# Patient Record
Sex: FEMALE | Race: BLACK OR AFRICAN AMERICAN | Employment: UNEMPLOYED | URBAN - METROPOLITAN AREA
[De-identification: names, ages, dates, MRNs, and addresses within clinical notes are randomized per-mention and may not be internally consistent; named-entity substitution may affect disease eponyms.]

---

## 2021-12-03 ENCOUNTER — APPOINTMENT (OUTPATIENT)
Dept: CT IMAGING | Age: 36
End: 2021-12-03
Payer: MEDICAID

## 2021-12-03 ENCOUNTER — HOSPITAL ENCOUNTER (EMERGENCY)
Age: 36
Discharge: HOME | End: 2021-12-03
Attending: EMERGENCY MEDICINE
Payer: MEDICAID

## 2021-12-03 VITALS
RESPIRATION RATE: 16 BRPM | OXYGEN SATURATION: 100 % | SYSTOLIC BLOOD PRESSURE: 166 MMHG | TEMPERATURE: 98.1 F | WEIGHT: 208.25 LBS | HEART RATE: 79 BPM | BODY MASS INDEX: 40.88 KG/M2 | DIASTOLIC BLOOD PRESSURE: 100 MMHG | HEIGHT: 60 IN

## 2021-12-03 DIAGNOSIS — M79.602 LEFT ARM PAIN: ICD-10-CM

## 2021-12-03 DIAGNOSIS — I10 ESSENTIAL HYPERTENSION: Primary | ICD-10-CM

## 2021-12-03 LAB
ANION GAP SERPL CALCULATED.3IONS-SCNC: 11 MMOL/L (ref 3–16)
BASOPHILS ABSOLUTE: 0.1 K/UL (ref 0–0.2)
BASOPHILS RELATIVE PERCENT: 1.1 %
BUN BLDV-MCNC: 9 MG/DL (ref 7–20)
CALCIUM SERPL-MCNC: 9.2 MG/DL (ref 8.3–10.6)
CHLORIDE BLD-SCNC: 106 MMOL/L (ref 99–110)
CO2: 24 MMOL/L (ref 21–32)
CREAT SERPL-MCNC: 0.7 MG/DL (ref 0.6–1.1)
EOSINOPHILS ABSOLUTE: 0.4 K/UL (ref 0–0.6)
EOSINOPHILS RELATIVE PERCENT: 5.4 %
GFR AFRICAN AMERICAN: >60
GFR NON-AFRICAN AMERICAN: >60
GLUCOSE BLD-MCNC: 104 MG/DL (ref 70–99)
HCT VFR BLD CALC: 35.2 % (ref 36–48)
HEMOGLOBIN: 11.4 G/DL (ref 12–16)
LYMPHOCYTES ABSOLUTE: 1.4 K/UL (ref 1–5.1)
LYMPHOCYTES RELATIVE PERCENT: 20.7 %
MCH RBC QN AUTO: 22.7 PG (ref 26–34)
MCHC RBC AUTO-ENTMCNC: 32.4 G/DL (ref 31–36)
MCV RBC AUTO: 70.2 FL (ref 80–100)
MONOCYTES ABSOLUTE: 0.5 K/UL (ref 0–1.3)
MONOCYTES RELATIVE PERCENT: 6.5 %
NEUTROPHILS ABSOLUTE: 4.6 K/UL (ref 1.7–7.7)
NEUTROPHILS RELATIVE PERCENT: 66.3 %
PDW BLD-RTO: 16.1 % (ref 12.4–15.4)
PLATELET # BLD: 356 K/UL (ref 135–450)
PMV BLD AUTO: 7.1 FL (ref 5–10.5)
POTASSIUM REFLEX MAGNESIUM: 4.3 MMOL/L (ref 3.5–5.1)
RBC # BLD: 5.01 M/UL (ref 4–5.2)
SODIUM BLD-SCNC: 141 MMOL/L (ref 136–145)
WBC # BLD: 7 K/UL (ref 4–11)

## 2021-12-03 PROCEDURE — 70450 CT HEAD/BRAIN W/O DYE: CPT

## 2021-12-03 PROCEDURE — 99283 EMERGENCY DEPT VISIT LOW MDM: CPT

## 2021-12-03 PROCEDURE — 85025 COMPLETE CBC W/AUTO DIFF WBC: CPT

## 2021-12-03 PROCEDURE — 80048 BASIC METABOLIC PNL TOTAL CA: CPT

## 2021-12-03 PROCEDURE — 93005 ELECTROCARDIOGRAM TRACING: CPT | Performed by: EMERGENCY MEDICINE

## 2021-12-03 RX ORDER — ESCITALOPRAM OXALATE 10 MG/1
10 TABLET ORAL DAILY
COMMUNITY

## 2021-12-03 RX ORDER — AMLODIPINE BESYLATE 5 MG/1
5 TABLET ORAL DAILY
Qty: 30 TABLET | Refills: 2 | Status: SHIPPED | OUTPATIENT
Start: 2021-12-03

## 2021-12-03 NOTE — ED PROVIDER NOTES
TRIAGE CHIEF COMPLAINT:   Chief Complaint   Patient presents with    Hypertension     pt states that she started yesterday with left arm pain going down into her hand and woke up this morning with a pulsation feeling in left hand and pt went to urgent care and was referred to ER due to blood pressure being high       HPI: Cande Cochran is a 39 y.o. female who presents to the emergency department with complaint of left arm pain and elevated blood pressure. Patient states that yesterday she noted some pain in her left upper arm that occasionally would radiate down to her forearm and left hand. It would come and go. No leg symptoms. No known injury. Denies neck pain. Denies numbness or weakness. Today she felt like her left hand was pulsating. She went to urgent care and was told her blood pressure was elevated at 180/100 and that she should immediately come to an ER. She denies headache or neck pain. No chest pain or shortness of breath. Denies any back pain. No dizziness or vertigo. Denies nausea or vomiting. No abdominal pain. For approximately 1 year she has had some intermittent elevated systolic blood pressures up to 160 but has never been treated for hypertension. REVIEW OF SYSTEMS:   10 systems reviewed. Pertinent positives per HPI. Otherwise noted to be negative. I have reviewed the triage/nursing documentation and agree unless otherwise noted below.     PAST MEDICAL HISTORY:   Past Medical History:   Diagnosis Date    PCOS (polycystic ovarian syndrome)         CURRENT MEDICATIONS:   Patient's Medications   New Prescriptions    No medications on file   Previous Medications    ESCITALOPRAM (LEXAPRO) 10 MG TABLET    Take 10 mg by mouth daily   Modified Medications    No medications on file   Discontinued Medications    ONDANSETRON (ZOFRAN) 4 MG TABLET    Take 1 tablet by mouth every 8 hours as needed for Nausea        SURGICAL HISTORY:       Procedure Laterality Date    WISDOM TOOTH EXTRACTION FAMILY HISTORY:   History reviewed. No pertinent family history. SOCIAL HISTORY:    reports that she has never smoked. She has never used smokeless tobacco. She reports current alcohol use. She reports that she does not use drugs. ALLERGIES:   Allergies   Allergen Reactions    Other      -cillins       PHYSICAL EXAM:  VITAL SIGNS: BP (!) 166/100   Pulse 79   Temp 98.1 °F (36.7 °C) (Oral)   Resp 16   Ht 5' (1.524 m)   Wt 208 lb 4 oz (94.5 kg)   LMP 11/19/2021   SpO2 100%   BMI 40.67 kg/m²   Constitutional:  No acute distress, Non-toxic appearance. Not pale or diaphoretic. No respiratory distress. Initial blood pressure here was 166/100. HENT: Normocephalic, Atraumatic Oropharynx moist, No oral exudates. TMs are normal.  Eyes:  PERRL, EOMI, Conjunctiva normal, No discharge. Neck: No tenderness, Supple, No lymphadenopathy, No stridor. No carotid bruits. Cardiovascular:  Normal heart rate, Normal rhythm, No murmurs, No rubs, No gallops. Pulmonary/Chest:  Normal breath sounds, No respiratory distress, No wheezing. Abdomen:   Soft, No tenderness, No masses, No pulsatile masses  Back:  No tenderness, No CVA tenderness  Extremities:  Normal range of motion, Intact distal pulses, No edema, No tenderness. There is no swelling. Radial pulses are 3+ and equal.  Normal capillary refill. Normal motor function in the radial, median and ulnar nerve distribution. Neurologic:  Alert & oriented x 3, Speech is clear and appropriate, No upper extremity drift or lower extremity weakness,  Normal sensory function, No facial asymmetry, no truncal or extremity ataxia. Normal gait. Skin:  Warm, Dry, No erythema, No rash  Psychiatric:  Affect normal, Mood normal      EKG:    EKG interpreted by myself. Normal sinus rhythm at a rate of 73. Sinus arrhythmia present. Axis is 6 degrees. There is no ischemia. No ectopy noted. QTC is 442. Radiology:  CT Head WO Contrast   Final Result      1.   No findings for acute intracranial abnormality. LAB  Labs Reviewed   CBC WITH AUTO DIFFERENTIAL - Abnormal; Notable for the following components:       Result Value    Hemoglobin 11.4 (*)     Hematocrit 35.2 (*)     MCV 70.2 (*)     MCH 22.7 (*)     RDW 16.1 (*)     All other components within normal limits    Narrative:     Performed at:  [address], [place] [address]   Phone [phone]   BASIC METABOLIC PANEL W/ REFLEX TO MG FOR LOW K - Abnormal; Notable for the following components:    Glucose 104 (*)     All other components within normal limits    Narrative:     Performed at:  [place] [id number],  [address] [place] [address], [place] [address]   Phone [phone]       ED COURSE & MEDICAL DECISION MAKING:  Pertinent Labs & Imaging studies reviewed. (See chart for details)  80-year-old female with approximate 1 year history of intermittent elevated blood pressure, never treated with antihypertensive medications states that yesterday she had some intermittent left arm pain that went down into her left hand. Today she felt like she had a pulsating sensation in her left hand and went to urgent care where she was told her blood pressure was 180/100. She was told to come here immediately. Blood pressure here on initial evaluation was 166/100. On recheck it was 149/93. She is neurologically intact. No clinical evidence of TIA, stroke, compartment syndrome, arterial insufficiency, DVT or ACS. EKG shows normal sinus rhythm with no ischemia or arrhythmia. CBC and BMP were normal.  CT head scan read by the radiologist and reviewed by myself shows nothing acute. Patient's left arm pain may represent muscle spasm or possibly cervical radiculopathy. Currently she has no pain. I feel the patient has hypertension and should be treated for this. She will be started on Norvasc 5 mg daily.   I recommended follow-up with primary care and return here for worse symptoms. I discussed with Jd Whittaker the results of the evaluation in the Emergency Department, diagnosis, care, prognosis and the importance of follow-up. The patient is stable for discharge. The patient and/or family are in agreement with the plan and all questions have been answered. Specific discharge instructions were explained, including reasons to return to the emergency department.           (Please note that portions of this note may have been completed with a voice recognition program.  Efforts were made to edit the dictation but occasionally words are mis-transcribed)      FINAL IMPRESSION:  1 --essential hypertension  2 --intermittent left arm pain                Tj Braswell MD  12/04/21 1445

## 2021-12-05 LAB
EKG ATRIAL RATE: 73 BPM
EKG DIAGNOSIS: NORMAL
EKG P AXIS: 53 DEGREES
EKG P-R INTERVAL: 164 MS
EKG Q-T INTERVAL: 402 MS
EKG QRS DURATION: 88 MS
EKG QTC CALCULATION (BAZETT): 442 MS
EKG R AXIS: 6 DEGREES
EKG T AXIS: 38 DEGREES
EKG VENTRICULAR RATE: 73 BPM

## 2021-12-05 PROCEDURE — 93010 ELECTROCARDIOGRAM REPORT: CPT | Performed by: INTERNAL MEDICINE
